# Patient Record
Sex: FEMALE | Race: WHITE | NOT HISPANIC OR LATINO | Employment: STUDENT | URBAN - METROPOLITAN AREA
[De-identification: names, ages, dates, MRNs, and addresses within clinical notes are randomized per-mention and may not be internally consistent; named-entity substitution may affect disease eponyms.]

---
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
## 2023-01-18 ENCOUNTER — OFFICE VISIT (OUTPATIENT)
Dept: PSYCHIATRY | Facility: CLINIC | Age: 18
End: 2023-01-18
Payer: MEDICAID

## 2023-01-18 DIAGNOSIS — F41.1 GAD (GENERALIZED ANXIETY DISORDER): Primary | ICD-10-CM

## 2023-01-18 DIAGNOSIS — F41.0 PANIC DISORDER: ICD-10-CM

## 2023-01-18 PROCEDURE — 99499 UNLISTED E&M SERVICE: CPT | Mod: HA,95,, | Performed by: PSYCHOLOGIST

## 2023-01-18 PROCEDURE — 90791 PSYCH DIAGNOSTIC EVALUATION: CPT | Mod: HA,95,, | Performed by: PSYCHOLOGIST

## 2023-01-18 PROCEDURE — 99499 NO LOS: ICD-10-PCS | Mod: HA,95,, | Performed by: PSYCHOLOGIST

## 2023-01-18 PROCEDURE — 90791 PR PSYCHIATRIC DIAGNOSTIC EVALUATION: ICD-10-PCS | Mod: HA,95,, | Performed by: PSYCHOLOGIST

## 2023-01-25 NOTE — PROGRESS NOTES
[facility] Psychiatry Initial Visit (PhD/LCSW)    Patient Name: Ismael Oconnell   Date: 1/18/2023  Site: [facility]  Referral source: Andree Escalera Np  [address] [place] [address]    Chief complaint/reason for encounter: Psychological Evaluation to assess suitability for admission to the [facility].  Clinical status of patient: Outpatient  Met with: Patient  CPT Code: 81192    Before this evaluation was initiated, the purposes and process of the assessment and the limits of confidentiality were discussed with the patient who expressed understanding of these issues and verbally consented to proceed with the evaluation.    The patient location is: [facility]    The chief complaint leading to consultation is: Anxiety    Visit type: audiovisual    Face to Face time with patient: 42    102 minutes of total time spent on the encounter, which includes face to face time and non-face to face time preparing to see the patient (eg, review of tests), Obtaining and/or reviewing separately obtained history, Documenting clinical information in the electronic or other health record, Independently interpreting results (not separately reported) and communicating results to the patient/family/caregiver, or Care coordination (not separately reported).     Each patient to whom he or she provides medical services by telemedicine is:  (1) informed of the relationship between the physician and patient and the respective role of any other health care provider with respect to management of the patient; and (2) notified that he or she may decline to receive medical services by telemedicine and may withdraw from such care at any time.    Notes:      History of present illness: Ms. Oconnell is a 17-year-old female who is pursuing psychotherapy to improve symptoms of anxiety.      Pain scale: History of joint pain and myalgia    Medical history:   Patient stated that she will get tonsils removed  soon as they have been problematic throughout her life. Patient reported that she was always sick, had a sore throat, and had sinus problems throughout childhood.    Psychiatric symptoms:    Depression:  She endorsed episodes of depressed mood or depression-related anhedonia, lack of motivation, lethargy, difficulty sleeping, difficulty concentrating, appetite changes, and fatigue. She denied suicidal ideation. Patient reported that she did not attend classes last semester and described symptoms as crippling. Patient reported feeling drained daily. Patient stated that she wants to attend school and attend class. She expressed that she struggled in Math and failed twice. 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 Patient reported that she has good days and bad days. Good days = social, extraverted, lively, life of the party Patient reported that she was active in sports during high school, but she has recently gained weight due to inactivity, fatigue, and changes in eating as a result of symptoms.     Lucinda/Hypomania: Denied. She denied periods of elevated mood or abnormally increased energy or goal-directed activity.    Anxiety: Patient endorsed excessive, exaggerated anxiety that was unmanageable. She stated that the onset of symptoms of anxiety was age 9 after her parents . She reported that she received counseling around this time, but she did not engage in open communication with the therapist. Patient also endorsed experiences of panic attacks. She stated that her heart began racing at night (palpitations) last semester after moving to [place] for school. Following, she went to the ER over the initial three days on campus to determine issue. Patient endorsed panic attacks and ruminating what if worst-case scenario thinking. She stated that she feels upset or  anxious before events even occurred. She reported that her last panic attack was approximately three days ago and occurrence has decreased since returning home for winter break.    Thoughts: Denied delusions, hallucinations.  Suicidal thoughts/behaviors: No current or past active suicidal ideation. No plan or intent to end her life.  Self-injury: Denied.    Current psychosocial stressors:  None other than presenting concerns. Patient conveyed that she loves school and campus  Report of coping skills: Spending time with friends regularly and during weekends, walking on campus, using a cold rag, talk therapy podcasts  Support system: Friends, roommates, mother, father  Strengths and Liabilities: Extraverted, fun-loving, passionate    Current and past substance use:  Alcohol:  Denied current use. Denied history of abuse or dependency.    Drugs: Denied current use. Denied history of abuse or dependency.  Tobacco: Denied current use.  Caffeine: Previously consumed Diet Coke, but stopped when informed that heart it could increase heart rate. Drinks water now     Current Psychiatric Treatment:  Medications: Effexor and Klonopin as needed  Propanol for heart racing   Trazadone prescribed, but never used  Patient expressed that her goal is to increase coping skills and wean off medications  Current Outpatient Medications on File Prior to Visit   Medication Sig Dispense Refill    clonazePAM (KLONOPIN) 0.5 MG tablet Take 1 tablet (0.5 mg total) by mouth 2 (two) times daily as needed for Anxiety (and panic). 60 tablet 0    propranoloL (INDERAL) 20 MG tablet Take 1 tablet (20 mg total) by mouth 2 (two) times daily as needed (anxiety and panic). 60 tablet 3    semaglutide (OZEMPIC) 0.25 mg or 0.5 mg(2 mg/1.5 mL) pen injector Inject 0.5 mg into the skin every 7 days.      traZODone (DESYREL) 50 MG tablet Take 1 tablet (50 mg total) by mouth every evening. 30 tablet 3    venlafaxine (EFFEXOR-XR) 150 MG Cp24 Take 1 capsule (150 mg  total) by mouth once daily. In addition to 75mg to equal 225mg. 30 capsule 3    venlafaxine (EFFEXOR-XR) 75 MG 24 hr capsule Take 1 capsule (75 mg total) by mouth once daily. In additon to 150mg to equal 225mg. 30 capsule 3     No current facility-administered medications on file prior to visit.        Psychotherapy: Patient stated that the onset of symptoms of anxiety was age 9 after her parents . She reported that she received counseling around this time, but she would not engage in open communication with the therapist    Psychiatric history:  Previous diagnosis: HAMZAH, Panic disorder, fatigue, depression, ADHD, myalgia, palpitations  Previous hospitalizations: Denied other than instances when patient visited ER when she felt her heart racing last semester.  History of outpatient treatment: Childhood  Previous suicide attempt: Denied.  Family history of psychiatric illness: Patient reported that her mother has some experience has history of anxiety and received therapy following the death of her father.    Trauma history: Denied.    Social history: Ms. Oconnell was raised by her biological mother and father in [place], [place].  Parents  when patient was 9. She reported that she still has a close relationship with both parents. She has a brother and paternal half-sister. She denied childhood trauma, abuse, and neglect. 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    Legal history: She did not report history of arrests and convictions. She did not report involvement in civil or criminal litigation.    Mental Status Exam:  General appearance: Appears stated age, neatly dressed, well groomed  Speech: Normal rate, normal tone, normal pitch, normal volume  Level of cooperation: Cooperative  Thought processes: Logical, goal-directed  Mood: Cheerful  Thought content: No illusions, no visual hallucinations, no auditory hallucinations, no delusions, no active or passive homicidal  thoughts, no active or passive suicidal ideation, no obsessions, no compulsions, no violence  Affect: Appropriate  Orientation: Oriented to person, place, and date  Memory: Intact  Judgment and insight: Fair  Language: Intact      Diagnostic impression:    ICD-10-CM ICD-9-CM   1. HAMZAH (generalized anxiety disorder)  F41.1 300.02   2. Panic disorder  F41.0 300.01          Plan: Ms. Oconnell will be admitted to the [facility]. She understood [facility] guidelines and signed the [facility] Consent and [facility] Partnership Agreement. She was provided with information about [facility] treatments and will proceed with CBT for Generalized Anxiety Disorder.      Length of Session: 42 minutes    Heather Bauman, PhD  Clinical Psychologist

## 2023-01-31 ENCOUNTER — TELEPHONE (OUTPATIENT)
Dept: PSYCHIATRY | Facility: CLINIC | Age: 18
End: 2023-01-31
Payer: MEDICAID

## 2023-01-31 NOTE — TELEPHONE ENCOUNTER
Spoke with patient to reschedule appointment. Provider will be out during time of original appointment. Patient agreed to reschedule to Wednesday 2/15 at 8am.

## 2023-02-15 ENCOUNTER — PATIENT MESSAGE (OUTPATIENT)
Dept: PSYCHIATRY | Facility: CLINIC | Age: 18
End: 2023-02-15
Payer: MEDICAID

## 2023-02-23 ENCOUNTER — OFFICE VISIT (OUTPATIENT)
Dept: PSYCHIATRY | Facility: CLINIC | Age: 18
End: 2023-02-23
Payer: MEDICAID

## 2023-02-23 VITALS
SYSTOLIC BLOOD PRESSURE: 146 MMHG | DIASTOLIC BLOOD PRESSURE: 55 MMHG | WEIGHT: 248.25 LBS | HEART RATE: 99 BPM | BODY MASS INDEX: 38.88 KG/M2

## 2023-02-23 DIAGNOSIS — G47.00 INSOMNIA, UNSPECIFIED TYPE: ICD-10-CM

## 2023-02-23 DIAGNOSIS — F41.1 GAD (GENERALIZED ANXIETY DISORDER): Primary | ICD-10-CM

## 2023-02-23 DIAGNOSIS — F32.A DEPRESSION, UNSPECIFIED DEPRESSION TYPE: ICD-10-CM

## 2023-02-23 DIAGNOSIS — E61.1 IRON DEFICIENCY: ICD-10-CM

## 2023-02-23 DIAGNOSIS — F41.0 PANIC DISORDER: ICD-10-CM

## 2023-02-23 PROCEDURE — 3008F PR BODY MASS INDEX (BMI) DOCUMENTED: ICD-10-PCS | Mod: SA,HA,CPTII, | Performed by: NURSE PRACTITIONER

## 2023-02-23 PROCEDURE — 3077F SYST BP >= 140 MM HG: CPT | Mod: SA,HA,CPTII, | Performed by: NURSE PRACTITIONER

## 2023-02-23 PROCEDURE — 99214 OFFICE O/P EST MOD 30 MIN: CPT | Mod: S$PBB,SA,HA, | Performed by: NURSE PRACTITIONER

## 2023-02-23 PROCEDURE — 1159F MED LIST DOCD IN RCRD: CPT | Mod: SA,HA,CPTII, | Performed by: NURSE PRACTITIONER

## 2023-02-23 PROCEDURE — 3077F PR MOST RECENT SYSTOLIC BLOOD PRESSURE >= 140 MM HG: ICD-10-PCS | Mod: SA,HA,CPTII, | Performed by: NURSE PRACTITIONER

## 2023-02-23 PROCEDURE — 1159F PR MEDICATION LIST DOCUMENTED IN MEDICAL RECORD: ICD-10-PCS | Mod: SA,HA,CPTII, | Performed by: NURSE PRACTITIONER

## 2023-02-23 PROCEDURE — 99999 PR PBB SHADOW E&M-EST. PATIENT-LVL II: CPT | Mod: PBBFAC,SA,HA, | Performed by: NURSE PRACTITIONER

## 2023-02-23 PROCEDURE — 90833 PSYTX W PT W E/M 30 MIN: CPT | Mod: SA,HA,, | Performed by: NURSE PRACTITIONER

## 2023-02-23 PROCEDURE — 3008F BODY MASS INDEX DOCD: CPT | Mod: SA,HA,CPTII, | Performed by: NURSE PRACTITIONER

## 2023-02-23 PROCEDURE — 99212 OFFICE O/P EST SF 10 MIN: CPT | Mod: PBBFAC | Performed by: NURSE PRACTITIONER

## 2023-02-23 PROCEDURE — 1160F RVW MEDS BY RX/DR IN RCRD: CPT | Mod: SA,HA,CPTII, | Performed by: NURSE PRACTITIONER

## 2023-02-23 PROCEDURE — 90833 PR PSYCHOTHERAPY W/PATIENT W/E&M, 30 MIN (ADD ON): ICD-10-PCS | Mod: SA,HA,, | Performed by: NURSE PRACTITIONER

## 2023-02-23 PROCEDURE — 3078F DIAST BP <80 MM HG: CPT | Mod: SA,HA,CPTII, | Performed by: NURSE PRACTITIONER

## 2023-02-23 PROCEDURE — 99999 PR PBB SHADOW E&M-EST. PATIENT-LVL II: ICD-10-PCS | Mod: PBBFAC,SA,HA, | Performed by: NURSE PRACTITIONER

## 2023-02-23 PROCEDURE — 3078F PR MOST RECENT DIASTOLIC BLOOD PRESSURE < 80 MM HG: ICD-10-PCS | Mod: SA,HA,CPTII, | Performed by: NURSE PRACTITIONER

## 2023-02-23 PROCEDURE — 1160F PR REVIEW ALL MEDS BY PRESCRIBER/CLIN PHARMACIST DOCUMENTED: ICD-10-PCS | Mod: SA,HA,CPTII, | Performed by: NURSE PRACTITIONER

## 2023-02-23 PROCEDURE — 99214 PR OFFICE/OUTPT VISIT, EST, LEVL IV, 30-39 MIN: ICD-10-PCS | Mod: S$PBB,SA,HA, | Performed by: NURSE PRACTITIONER

## 2023-02-23 RX ORDER — CLONAZEPAM 1 MG/1
1 TABLET ORAL 2 TIMES DAILY PRN
Qty: 60 TABLET | Refills: 0 | Status: SHIPPED | OUTPATIENT
Start: 2023-02-23 | End: 2024-02-21 | Stop reason: SDUPTHER

## 2023-02-23 RX ORDER — VENLAFAXINE HYDROCHLORIDE 150 MG/1
150 CAPSULE, EXTENDED RELEASE ORAL DAILY
Qty: 90 CAPSULE | Refills: 1 | Status: SHIPPED | OUTPATIENT
Start: 2023-02-23 | End: 2023-09-14 | Stop reason: SDUPTHER

## 2023-02-23 RX ORDER — VENLAFAXINE HYDROCHLORIDE 75 MG/1
75 CAPSULE, EXTENDED RELEASE ORAL DAILY
Qty: 90 CAPSULE | Refills: 1 | Status: SHIPPED | OUTPATIENT
Start: 2023-02-23 | End: 2023-09-14 | Stop reason: SDUPTHER

## 2023-02-23 NOTE — PROGRESS NOTES
"Outpatient Psychiatry Follow-Up Visit (MD/NP)    2/23/2023    Clinical Status of Patient:  Outpatient (Ambulatory)    Chief Complaint:  Ismael Oconnell is a 17 y.o. female who presents today for follow-up of depression and anxiety.  Met with patient.      Interval History and Content of Current Session:  Interim Events/Subjective Report/Content of Current Session:     Patient last seen 12/16/2022.     Patient reported a history of anxiety, panic and ADHD.     At the time of initial evaluation was taking lexapro 20mg and clonazepam 0.5mg PRN. At the time was recently on Stratterra 60mg, but stopped taking medication prior week because did not find it to be helpful.     Recently taken off of metformin, now only on Ozempic 0.5mg weekly [place].      Was previously seeing Dr. Arauz at [facility]. Had been seing him for about a year, and was not happy with the progress.      Reported onset of anxiety to be when she was going to 8th grade. Admitted at that time that is when parents were getting a divorce. Symptoms of anxiety started with difficulty breathing, restlessness. Started to see a counselor at [facility], but felt she would just cry during the visits.     Switched therapists, but still struggled with finding the right fit or "I would get better and stop going."      During high school mother would give her PRN clonazepam, which was helpful.      First sought medication management around 13 years old with a trial of Celexa. Ineffective. Then switched to a trial of Zoloft but was not effective either.      Switched to Lexapro a year and a half prior to initial visit, and had been on 20mg for the past year. Reported lexapro has been 20 % effective for symptom management.     Buspar added 3 months ago, but found it to be ineffective so it was discontinued.      Was diagnosed with ADHD last year, started with a trial of Adderall, but after 3 doses found it to cause " anxiety and palpitations and it was stopped. Switched to Strattera, but was ineffective so discontinued.     Currently a student at [employer] in her sophomore year. 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 Also considering Med school. Moving to [place] tomorrow with 3 other roomates. They are all friends.      Also worked this summer at [employer].     Complained of depression, anxiety, and panic symptoms.     Decreased lexapro to 10mg for 3-5 days then decreased to 5mg for another week then discontinue. Plan to start Effexor and titrate dose.     Following initial eval, 3 days later went to urgent care, who recommended she present to ED. Was seen in ED in [place] due to rapid heart beat, chest pain. Ruled out cardiac etiology.     Started Effexor XR which was increased to 75mg and again to 150mg last visit. Ok to refill small temporary supply of clonazepam started by a previous provider. Additionally started Propranolol 20mg BID PRN for anxiety and panic in an attempt to replace.     Presented last visit with mother, who noted noting a decline in mood. Mother stated a colleague mentioned to her a recommendation of Abilify. Discussed this in length.     Increased Effexor to 225mg  and started Trazodone 50mg last visit and referred to [facility] for [facility]otherapy.     Presents today reporting she has tolerated the increase in Effexor well.     Denies any side effects.     At last visit denied significant benefits with anxiety since starting medication or increasing dose, but presents today noting significant benefits in anxiety since the additional increase.    Decided to remain enrolled in school this semester.    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    Has a lighter course load this semester to help boost her GPA.     Notes improved mood, less fatigue, less need for naps.     Noted iron deficiency at previous lab visits, has been consistent  with supplement administration.     Notes decrease in amount of panic attacks experienced. Took 1mg of clonazepam at that time which had been helpful.     Reports improvements in sleep overall since she has had more energy and required less naps during the day. Has not taken trazodone due to not wanting to be on more medication than she has to.     Discussed at initial visit with patient that the cluster of noted throat obstruction with tonsils, insomnia, snoring, inattention/lack of focus, daytime fatigue, and episode reported of nocturia raises concern for the possibility for sleep apnea attributing to her current symptom presentation.     Admitted she often had swollen tonsils, and has been recommended she get them removed but has been scared.    Reports today she has plans to get tonsils removed over the summer.     Has been able to establish care with Dr. Bauman in [facility].     Denies SI/HI/AVH.     Previous medicatoin trials:  Zoloft  Lexapro  Celexa   Stratterra  Buspar           Psychotherapy:  Target symptoms: depression, anxiety , adjustment  Why chosen therapy is appropriate versus another modality: relevant to diagnosis  Outcome monitoring methods: self-report, observation  Therapeutic intervention type: insight oriented psychotherapy, supportive psychotherapy  Topics discussed/themes: building skills sets for symptom management, symptom recognition, life stage transitional issues  The patient's response to the intervention is accepting. The patient's progress toward treatment goals is good.   Duration of intervention: 18 minutes.    Review of Systems   PSYCHIATRIC: Pertinant items are noted in the narrative.  CONSTITUTIONAL: No weight gain or loss.   MUSCULOSKELETAL: No pain or stiffness of the joints.  NEUROLOGIC: No weakness, sensory changes, seizures, confusion, memory loss, tremor or other abnormal movements.  ENDOCRINE: No polydipsia or polyuria.  INTEGUMENTARY: No rashes or  lacerations.  EYES: No exophthalmos, jaundice or blindness.  ENT: No dizziness, tinnitus or hearing loss.  RESPIRATORY: No shortness of breath.  CARDIOVASCULAR: No tachycardia or chest pain.  GASTROINTESTINAL: No nausea, vomiting, pain, constipation or diarrhea.  GENITOURINARY: No frequency, dysuria or sexual dysfunction.  HEMATOLOGIC/LYMPHATIC: No excessive bleeding, prolonged or excessive bleeding after dental extraction/injury.  ALLERGIC/IMMUNOLOGIC: No allergic response to materials, foods or animals at this time.      Past Medical, Family and Social History: The patient's past medical, family and social history have been reviewed and updated as appropriate within the electronic medical record - see encounter notes.    Compliance: yes    Side effects: None    Risk Parameters:  Patient reports no suicidal ideation  Patient reports no homicidal ideation  Patient reports no self-injurious behavior  Patient reports no violent behavior    Exam (detailed: at least 9 elements; comprehensive: all 15 elements)   Constitutional  Vitals:  Most recent vital signs, dated less than 90 days prior to this appointment, were reviewed.   Vitals:    02/23/23 1359   BP: (!) 146/55   Pulse: 99   Weight: 112.6 kg (248 lb 3.8 oz)          General:  unremarkable, age appropriate     Musculoskeletal  Muscle Strength/Tone:  not examined   Gait & Station:  non-ataxic     Psychiatric  Speech:  no latency; no press   Mood & Affect:  anxious  congruent and appropriate   Thought Process:  normal and logical   Associations:  intact   Thought Content:  normal, no suicidality, no homicidality, delusions, or paranoia   Insight:  intact   Judgement: behavior is adequate to circumstances   Orientation:  grossly intact   Memory: intact for content of interview   Language: grossly intact   Attention Span & Concentration:  able to focus   Fund of Knowledge:  intact and appropriate to age and level of education     Assessment and Diagnosis    Status/Progress: Based on the examination today, the patient's problem(s) is/are improved and adequately but not ideally controlled.  New problems have not been presented today.   Co-morbidities, Diagnostic uncertainty, and Lack of compliance are not complicating management of the primary condition.  There are no active rule-out diagnoses for this patient at this time.     General Impression: Ismael Oconnell is a 17 year old female presenting to follow up after establishing care for evaluation and management of depression, anxiety, panic, and ADHD.      Discussed holding on treatment of ADHD symptoms at this time as panic symptoms place priority. Additionally discussed that most medication to treat ADHD has risk of increasing anxiety. Patient very accepting.      Discussed future referral to sleep medicine, especially if sleep or fatigue does not improve with better management of anxiety.      Discussed possibility of academic probation with decline in grades last semester and offered patient my assistance to advocate as she has been engaged in treatment.     Has remained in school this semester taking lighter courses to assist in boosting GPA which she reports has been going well.          ICD-10-CM ICD-9-CM   1. HAMZAH (generalized anxiety disorder)  F41.1 300.02   2. Panic disorder  F41.0 300.01   3. Depression, unspecified depression type  F32.A 311   4. Insomnia, unspecified type  G47.00 780.52   5. Iron deficiency  E61.1 280.9           Intervention/Counseling/Treatment Plan   Medication Management: Continue Effexor 225 mg for depression, anxiety and panic. OK to refill clonazepam and discussed this being temporary while we wait to achieve therapeutic levels of Effexor, or find alternative PRN medication option. Continue trazodone 50mg for insomnia and adjunct mood support as patient prefers to keep as PRN as needed  Labs, Diagnostic Studies: reviewed labs ordered at initial visit including CBC,  CMP, TSH, Vitamin  D. Revealed iron deficiency. Discussed supplement in portal and currently taking. Plan to reassess at next visit to assess if able to replenish stores with consistent administration.   Referral to [facility] psychotherapy.    reviewed no flagged dispensing.  Discussed with patient informed consent, risks vs. benefits, alternative treatments, side effect profile and the inherent unpredictability of individual responses to these treatments. The patient expresses understanding of the above and displays the capacity to agree with this current plan and had no other questions.  Encouraged Patient to keep future appointments.   Take medications as prescribed and abstain from substance abuse.   Safety plan reviewed with patient for worsening condition or suicidal ideations. In the event of an emergency patient was advised to go to the emergency room.       Return to Clinic: 3 months

## 2023-03-01 ENCOUNTER — PATIENT MESSAGE (OUTPATIENT)
Dept: PSYCHIATRY | Facility: CLINIC | Age: 18
End: 2023-03-01
Payer: MEDICAID

## 2023-03-08 ENCOUNTER — OFFICE VISIT (OUTPATIENT)
Dept: PSYCHIATRY | Facility: CLINIC | Age: 18
End: 2023-03-08
Payer: MEDICAID

## 2023-03-08 ENCOUNTER — PATIENT MESSAGE (OUTPATIENT)
Dept: PSYCHIATRY | Facility: CLINIC | Age: 18
End: 2023-03-08

## 2023-03-08 DIAGNOSIS — F41.1 GAD (GENERALIZED ANXIETY DISORDER): Primary | ICD-10-CM

## 2023-03-08 PROCEDURE — 90834 PR PSYCHOTHERAPY W/PATIENT, 45 MIN: ICD-10-PCS | Mod: 95,,, | Performed by: PSYCHOLOGIST

## 2023-03-08 PROCEDURE — 99499 UNLISTED E&M SERVICE: CPT | Mod: 95,,, | Performed by: PSYCHOLOGIST

## 2023-03-08 PROCEDURE — 99499 NO LOS: ICD-10-PCS | Mod: 95,,, | Performed by: PSYCHOLOGIST

## 2023-03-08 PROCEDURE — 90834 PSYTX W PT 45 MINUTES: CPT | Mod: 95,,, | Performed by: PSYCHOLOGIST

## 2023-03-09 NOTE — PROGRESS NOTES
"  [facility]  Individual Psychotherapy (PhD/LCSW)    3/8/23     The patient location is: [facility]  in [place], [place]  The chief complaint leading to consultation is: Anxiety    Visit type: audiovisual    Face to Face time with patient: 42  60 minutes of total time spent on the encounter, which includes face to face time and non-face to face time preparing to see the patient (eg, review of tests), Obtaining and/or reviewing separately obtained history, Documenting clinical information in the electronic or other health record, Independently interpreting results (not separately reported) and communicating results to the patient/family/caregiver, or Care coordination (not separately reported).       Each patient to whom he or she provides medical services by telemedicine is:  (1) informed of the relationship between the physician and patient and the respective role of any other health care provider with respect to management of the patient; and (2) notified that he or she may decline to receive medical services by telemedicine and may withdraw from such care at any time.    Notes:      Therapeutic Intervention: Met with patient.  Outpatient - Insight oriented psychotherapy 42 min - CPT code 81800     Chief complaint/reason for encounter: Anxiety       Interval history and content of current session:  Ismael Oconnell arrived on time to the scheduled appointment. She appeared calm and she stated that she was feeling "good."    Cognitive Behavioral Therapy for Anxiety (CBT-A), Session #1  Processing Power/Control and Introducing Esteem  HAMZAH-7: 4 (Minimal Anxiety)     Session Focus:  Normal Worry vs. Generalized Anxiety  Why It's Important to Record  Record Keeping              Worry Record              Daily Mood Record              Introduced the Esteem Theme     Practice Assignment:  Monitor anxiety episodes and daily anxiety using the Worry Record and the Daily Mood Record.        Treatment plan:  Target symptoms: "  anxiety-related symptoms  Why chosen therapy is appropriate versus another modality: relevant to diagnosis, evidence-based practice  Outcome monitoring methods: self-report, checklist/rating scale  Therapeutic intervention type: insight-oriented psychotherapy, behavior modifying psychotherapy     Risk parameters:  Patient reports no suicidal ideation  Patient reports no homicidal ideation  Patient reports no self-injurious behavior  Patient reports no violent behavior     Verbal deficits: None     Patient's response to intervention:  Receptive      Progress toward goals and other mental status changes:  Started     Diagnosis:       ICD-10-CM ICD-9-CM   1. HAMZAH (generalized anxiety disorder)  F41.1 300.02         Plan:  Continue individual psychotherapy     Return to clinic: 1 week     Length of Service (minutes): 42

## 2023-03-17 ENCOUNTER — PATIENT MESSAGE (OUTPATIENT)
Dept: PSYCHIATRY | Facility: CLINIC | Age: 18
End: 2023-03-17
Payer: MEDICAID

## 2023-03-22 ENCOUNTER — OFFICE VISIT (OUTPATIENT)
Dept: PSYCHIATRY | Facility: CLINIC | Age: 18
End: 2023-03-22
Payer: MEDICAID

## 2023-03-22 DIAGNOSIS — F41.0 PANIC DISORDER: ICD-10-CM

## 2023-03-22 DIAGNOSIS — F41.1 GAD (GENERALIZED ANXIETY DISORDER): Primary | ICD-10-CM

## 2023-03-22 PROCEDURE — 90834 PSYTX W PT 45 MINUTES: CPT | Mod: 95,AH,HA, | Performed by: PSYCHOLOGIST

## 2023-03-22 PROCEDURE — 99499 NO LOS: ICD-10-PCS | Mod: AH,HA,95, | Performed by: PSYCHOLOGIST

## 2023-03-22 PROCEDURE — 99499 UNLISTED E&M SERVICE: CPT | Mod: AH,HA,95, | Performed by: PSYCHOLOGIST

## 2023-03-22 PROCEDURE — 90834 PR PSYCHOTHERAPY W/PATIENT, 45 MIN: ICD-10-PCS | Mod: 95,AH,HA, | Performed by: PSYCHOLOGIST

## 2023-03-22 NOTE — PROGRESS NOTES
"  [facility]  Individual Psychotherapy (PhD/LCSW)     3/22/23     The patient location is: [facility]  in [place], [place]  The chief complaint leading to consultation is: Anxiety     Visit type: audiovisual     Face to Face time with patient: 42  60 minutes of total time spent on the encounter, which includes face to face time and non-face to face time preparing to see the patient (eg, review of tests), Obtaining and/or reviewing separately obtained history, Documenting clinical information in the electronic or other health record, Independently interpreting results (not separately reported) and communicating results to the patient/family/caregiver, or Care coordination (not separately reported).         Each patient to whom he or she provides medical services by telemedicine is:  (1) informed of the relationship between the physician and patient and the respective role of any other health care provider with respect to management of the patient; and (2) notified that he or she may decline to receive medical services by telemedicine and may withdraw from such care at any time.     Notes:       Therapeutic Intervention: Met with patient.  Outpatient - Insight oriented psychotherapy 45 min - CPT code 51345     Chief complaint/reason for encounter: Anxiety       Interval history and content of current session:  Ismael Oconnell arrived on time to the scheduled appointment. She appeared calm and she stated that she was feeling "good."       Cognitive Behavioral Therapy for Anxiety (CBT-A), Session #2     Session Focus:  Review homework  Understand components of anxiety   Explore  negative thoughts about body image, body dissatisfaction, comparison to others,                  the "thin ideal" and how it leads to anxiety  Recognize physical symptoms of anxiety and record them   Continue record keeping                 Practice Assignment:  Use information on worry record to identify positive feedback loops among anxiety " components  Continue using worry record and daily mood record  Begin to explore relaxation and grounding exercises      Treatment plan:  Target symptoms:  anxiety-related symptoms  Why chosen therapy is appropriate versus another modality: relevant to diagnosis, evidence-based practice  Outcome monitoring methods: self-report, checklist/rating scale  Therapeutic intervention type: insight-oriented psychotherapy, behavior modifying psychotherapy     Risk parameters:  Patient reports no suicidal ideation  Patient reports no homicidal ideation  Patient reports no self-injurious behavior  Patient reports no violent behavior     Verbal deficits: None     Patient's response to intervention:  Receptive      Progress toward goals and other mental status changes:  Progressing appropriately     Diagnosis:       ICD-10-CM ICD-9-CM   1. HAMZAH (generalized anxiety disorder)  F41.1 300.02         Plan:  Continue individual psychotherapy     Return to clinic: 1 week     Length of Service (minutes): 42

## 2023-03-29 ENCOUNTER — PATIENT MESSAGE (OUTPATIENT)
Dept: PSYCHIATRY | Facility: CLINIC | Age: 18
End: 2023-03-29

## 2023-04-16 ENCOUNTER — HOSPITAL ENCOUNTER (EMERGENCY)
Facility: HOSPITAL | Age: 18
Discharge: HOME | End: 2023-04-17
Attending: EMERGENCY MEDICINE
Payer: MEDICAID

## 2023-04-16 DIAGNOSIS — S09.90XA TRAUMATIC INJURY OF HEAD, INITIAL ENCOUNTER: ICD-10-CM
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
## 2023-04-16 DIAGNOSIS — S06.0X0A CONCUSSION WITHOUT LOSS OF CONSCIOUSNESS, INITIAL ENCOUNTER: ICD-10-CM

## 2023-04-16 LAB
B-HCG UR QL: NEGATIVE
CTP QC/QA: YES

## 2023-04-16 PROCEDURE — 81025 URINE PREGNANCY TEST: CPT | Performed by: PHYSICIAN ASSISTANT

## 2023-04-16 PROCEDURE — 99283 PR EMERGENCY DEPT VISIT,LEVEL III: ICD-10-PCS | Mod: ,,, | Performed by: PHYSICIAN ASSISTANT

## 2023-04-16 PROCEDURE — 99284 EMERGENCY DEPT VISIT MOD MDM: CPT

## 2023-04-16 PROCEDURE — 99283 EMERGENCY DEPT VISIT LOW MDM: CPT | Mod: ,,, | Performed by: PHYSICIAN ASSISTANT

## 2023-04-16 NOTE — Clinical Note
"Ismael"Yamini Oconnell was seen and treated in our emergency department on 4/16/2023.  She may return to school on 04/17/2023.      If you have any questions or concerns, please don't hesitate to call.      Giovanna Carter PA-C"

## 2023-04-16 NOTE — Clinical Note
"Ismael"Yamini Oconnell was seen and treated in our emergency department on 4/16/2023.  She may return to work on 04/18/2023.       If you have any questions or concerns, please don't hesitate to call.      Giovanna Carter PA-C"

## 2023-04-17 VITALS
DIASTOLIC BLOOD PRESSURE: 60 MMHG | OXYGEN SATURATION: 100 % | BODY MASS INDEX: 37.67 KG/M2 | HEIGHT: 67 IN | HEART RATE: 75 BPM | WEIGHT: 240 LBS | RESPIRATION RATE: 20 BRPM | TEMPERATURE: 98 F | SYSTOLIC BLOOD PRESSURE: 124 MMHG

## 2023-04-17 NOTE — DISCHARGE INSTRUCTIONS
Imaging Results              CT Head Without Contrast (Final result)  Result time 04/17/23 00:47:09      Final result by Zaira Vegas MD (04/17/23 00:47:09)                   Impression:      No acute abnormality.      Electronically signed by: Zaira Vegas  Date:    04/17/2023  Time:    00:47               Narrative:    EXAMINATION:  CT HEAD WITHOUT CONTRAST    CLINICAL HISTORY:  Head trauma, minor, normal mental status (Age 19-64y);Neuro deficit, acute, stroke suspected;    TECHNIQUE:  Low dose axial CT images obtained throughout the head without intravenous contrast. Sagittal and coronal reconstructions were performed.    COMPARISON:  None.    FINDINGS:  Intracranial compartment:    Ventricles and sulci are normal in size for age without evidence of hydrocephalus. No extra-axial blood or fluid collections.    The brain parenchyma appears normal. No parenchymal mass, hemorrhage, edema or major vascular distribution infarct.    Skull/extracranial contents (limited evaluation): No fracture. Mastoid air cells and paranasal sinuses are essentially clear.
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
## 2023-04-19 ENCOUNTER — PATIENT MESSAGE (OUTPATIENT)
Dept: PSYCHIATRY | Facility: CLINIC | Age: 18
End: 2023-04-19
Payer: MEDICAID

## 2023-06-21 ENCOUNTER — PATIENT MESSAGE (OUTPATIENT)
Dept: PSYCHIATRY | Facility: CLINIC | Age: 18
End: 2023-06-21
Payer: MEDICAID

## 2023-07-12 ENCOUNTER — PATIENT MESSAGE (OUTPATIENT)
Dept: PSYCHIATRY | Facility: CLINIC | Age: 18
End: 2023-07-12
Payer: MEDICAID

## 2023-08-21 ENCOUNTER — PATIENT MESSAGE (OUTPATIENT)
Dept: PSYCHIATRY | Facility: CLINIC | Age: 18
End: 2023-08-21
Payer: MEDICAID

## 2023-09-14 RX ORDER — VENLAFAXINE HYDROCHLORIDE 75 MG/1
75 CAPSULE, EXTENDED RELEASE ORAL DAILY
Qty: 90 CAPSULE | Refills: 1 | Status: SHIPPED | OUTPATIENT
Start: 2023-09-14 | End: 2023-10-04 | Stop reason: SDUPTHER

## 2023-09-14 RX ORDER — VENLAFAXINE HYDROCHLORIDE 150 MG/1
150 CAPSULE, EXTENDED RELEASE ORAL DAILY
Qty: 90 CAPSULE | Refills: 1 | Status: SHIPPED | OUTPATIENT
Start: 2023-09-14 | End: 2023-10-04 | Stop reason: SDUPTHER

## 2023-10-04 ENCOUNTER — OFFICE VISIT (OUTPATIENT)
Dept: PSYCHIATRY | Facility: CLINIC | Age: 18
End: 2023-10-04
Payer: MEDICAID

## 2023-10-04 DIAGNOSIS — F41.0 PANIC DISORDER: ICD-10-CM

## 2023-10-04 DIAGNOSIS — F32.A DEPRESSION, UNSPECIFIED DEPRESSION TYPE: ICD-10-CM

## 2023-10-04 DIAGNOSIS — F41.1 GAD (GENERALIZED ANXIETY DISORDER): Primary | ICD-10-CM

## 2023-10-04 DIAGNOSIS — E61.1 IRON DEFICIENCY: ICD-10-CM

## 2023-10-04 DIAGNOSIS — G47.00 INSOMNIA, UNSPECIFIED TYPE: ICD-10-CM

## 2023-10-04 PROCEDURE — 90833 PR PSYCHOTHERAPY W/PATIENT W/E&M, 30 MIN (ADD ON): ICD-10-PCS | Mod: SA,HA,95, | Performed by: NURSE PRACTITIONER

## 2023-10-04 PROCEDURE — 99214 OFFICE O/P EST MOD 30 MIN: CPT | Mod: SA,HA,95, | Performed by: NURSE PRACTITIONER

## 2023-10-04 PROCEDURE — 1159F MED LIST DOCD IN RCRD: CPT | Mod: CPTII,95,, | Performed by: NURSE PRACTITIONER

## 2023-10-04 PROCEDURE — 99214 PR OFFICE/OUTPT VISIT, EST, LEVL IV, 30-39 MIN: ICD-10-PCS | Mod: SA,HA,95, | Performed by: NURSE PRACTITIONER

## 2023-10-04 PROCEDURE — 1159F PR MEDICATION LIST DOCUMENTED IN MEDICAL RECORD: ICD-10-PCS | Mod: CPTII,95,, | Performed by: NURSE PRACTITIONER

## 2023-10-04 PROCEDURE — 1160F PR REVIEW ALL MEDS BY PRESCRIBER/CLIN PHARMACIST DOCUMENTED: ICD-10-PCS | Mod: CPTII,95,, | Performed by: NURSE PRACTITIONER

## 2023-10-04 PROCEDURE — 90833 PSYTX W PT W E/M 30 MIN: CPT | Mod: SA,HA,95, | Performed by: NURSE PRACTITIONER

## 2023-10-04 PROCEDURE — 1160F RVW MEDS BY RX/DR IN RCRD: CPT | Mod: CPTII,95,, | Performed by: NURSE PRACTITIONER

## 2023-10-04 RX ORDER — TRAZODONE HYDROCHLORIDE 50 MG/1
50 TABLET ORAL NIGHTLY
Qty: 90 TABLET | Refills: 1 | Status: SHIPPED | OUTPATIENT
Start: 2023-10-04 | End: 2024-01-09 | Stop reason: SDUPTHER

## 2023-10-04 RX ORDER — VENLAFAXINE HYDROCHLORIDE 150 MG/1
150 CAPSULE, EXTENDED RELEASE ORAL DAILY
Qty: 90 CAPSULE | Refills: 1 | Status: SHIPPED | OUTPATIENT
Start: 2023-10-04 | End: 2024-01-09 | Stop reason: SDUPTHER

## 2023-10-04 RX ORDER — PROPRANOLOL HYDROCHLORIDE 20 MG/1
TABLET ORAL
Qty: 60 TABLET | Refills: 3 | Status: SHIPPED | OUTPATIENT
Start: 2023-10-04 | End: 2024-01-09 | Stop reason: SDUPTHER

## 2023-10-04 RX ORDER — VENLAFAXINE HYDROCHLORIDE 75 MG/1
75 CAPSULE, EXTENDED RELEASE ORAL DAILY
Qty: 90 CAPSULE | Refills: 1 | Status: SHIPPED | OUTPATIENT
Start: 2023-10-04 | End: 2024-01-09 | Stop reason: SDUPTHER
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
## 2023-12-10 ENCOUNTER — PATIENT MESSAGE (OUTPATIENT)
Dept: PSYCHIATRY | Facility: CLINIC | Age: 18
End: 2023-12-10
Payer: MEDICAID

## 2024-01-09 ENCOUNTER — OFFICE VISIT (OUTPATIENT)
Dept: PSYCHIATRY | Facility: CLINIC | Age: 19
End: 2024-01-09
Payer: MEDICAID

## 2024-01-09 DIAGNOSIS — F41.1 GAD (GENERALIZED ANXIETY DISORDER): Primary | ICD-10-CM

## 2024-01-09 DIAGNOSIS — F32.A DEPRESSION, UNSPECIFIED DEPRESSION TYPE: ICD-10-CM

## 2024-01-09 DIAGNOSIS — F41.0 PANIC DISORDER: ICD-10-CM

## 2024-01-09 DIAGNOSIS — R63.2 BINGE EATING: ICD-10-CM

## 2024-01-09 DIAGNOSIS — E61.1 IRON DEFICIENCY: ICD-10-CM

## 2024-01-09 DIAGNOSIS — G47.00 INSOMNIA, UNSPECIFIED TYPE: ICD-10-CM

## 2024-01-09 PROCEDURE — 1160F RVW MEDS BY RX/DR IN RCRD: CPT | Mod: CPTII,95,, | Performed by: NURSE PRACTITIONER

## 2024-01-09 PROCEDURE — 1159F MED LIST DOCD IN RCRD: CPT | Mod: CPTII,95,, | Performed by: NURSE PRACTITIONER

## 2024-01-09 PROCEDURE — 99214 OFFICE O/P EST MOD 30 MIN: CPT | Mod: SA,HA,95, | Performed by: NURSE PRACTITIONER

## 2024-01-09 PROCEDURE — 90833 PSYTX W PT W E/M 30 MIN: CPT | Mod: SA,HA,95, | Performed by: NURSE PRACTITIONER

## 2024-01-09 RX ORDER — VENLAFAXINE HYDROCHLORIDE 75 MG/1
75 CAPSULE, EXTENDED RELEASE ORAL DAILY
Qty: 90 CAPSULE | Refills: 1 | Status: SHIPPED | OUTPATIENT
Start: 2024-01-09 | End: 2025-01-08

## 2024-01-09 RX ORDER — PROPRANOLOL HYDROCHLORIDE 20 MG/1
TABLET ORAL
Qty: 60 TABLET | Refills: 3 | Status: SHIPPED | OUTPATIENT
Start: 2024-01-09

## 2024-01-09 RX ORDER — VENLAFAXINE HYDROCHLORIDE 150 MG/1
150 CAPSULE, EXTENDED RELEASE ORAL DAILY
Qty: 90 CAPSULE | Refills: 1 | Status: SHIPPED | OUTPATIENT
Start: 2024-01-09 | End: 2025-01-08

## 2024-01-09 RX ORDER — TRAZODONE HYDROCHLORIDE 50 MG/1
50 TABLET ORAL NIGHTLY
Qty: 90 TABLET | Refills: 1 | Status: SHIPPED | OUTPATIENT
Start: 2024-01-09 | End: 2025-01-08

## 2024-01-09 NOTE — PROGRESS NOTES
Outpatient Psychiatry Follow-Up Visit (MD/NP)    1/9/2024    The patient location is: [facility]  The chief complaint leading to consultation is: anxiety     Visit type: audiovisual    Face to Face time with patient: 39 minutes   49 minutes of total time spent on the encounter, which includes face to face time and non-face to face time preparing to see the patient (eg, review of tests), Obtaining and/or reviewing separately obtained history, Documenting clinical information in the electronic or other health record, Independently interpreting results (not separately reported) and communicating results to the patient/family/caregiver, or Care coordination (not separately reported).         Each patient to whom he or she provides medical services by telemedicine is:  (1) informed of the relationship between the physician and patient and the respective role of any other health care provider with respect to management of the patient; and (2) notified that he or she may decline to receive medical services by telemedicine and may withdraw from such care at any time.    Notes:      Clinical Status of Patient:  Outpatient (Ambulatory)    Chief Complaint:  Ismael Oconnell is a 18 y.o. female who presents today for follow-up of depression and anxiety.  Met with patient.      Interval History and Content of Current Session:  Interim Events/Subjective Report/Content of Current Session:     Patient last seen 10/04/2023.    Patient reported a history of anxiety, panic and ADHD.     At the time of initial evaluation was taking lexapro 20mg and clonazepam 0.5mg PRN. At the time was recently on Stratterra 60mg, but stopped taking medication prior week because did not find it to be helpful.     At the time had recently been taken off of metformin, switched to Ozempic 0.5mg weekly [place].      Was previously seeing Dr. Arauz at [facility]. Had been seing him for about a year, and was not  "happy with the progress.      Reported onset of anxiety to be when she was going to 8th grade. Admitted at that time that is when parents were getting a divorce. Symptoms of anxiety started with difficulty breathing, restlessness. Started to see a counselor at [facility], but felt she would just cry during the visits.     Switched therapists, but still struggled with finding the right fit or "I would get better and stop going."      During high school mother would give her PRN clonazepam, which was helpful.      First sought medication management around 13 years old with a trial of Celexa. Ineffective. Then switched to a trial of Zoloft but was not effective either.      Switched to Lexapro a year and a half prior to initial visit, and had been on 20mg for the past year. Reported lexapro has been 20 % effective for symptom management.     Busmaria teresa added 3 months ago, but found it to be ineffective so it was discontinued.      Was diagnosed with ADHD last year, started with a trial of Adderall, but after 3 doses found it to cause anxiety and palpitations and it was stopped. Switched to Strattera, but was ineffective so discontinued.     Currently a student at [employer]. Was going to school for kinesiology on a PA track. Also considering Med school. Moving to [place] tomorrow with 3 other roomates. They are all friends.      Also worked at [employer].     Complained of depression, anxiety, and panic symptoms.     Decreased lexapro to 10mg for 3-5 days then decreased to 5mg for another week then discontinue. Plan to start Effexor and titrate dose.     Following initial eval, 3 days later went to urgent care, who recommended she present to ED. Was seen in ED in [place] due to rapid heart beat, chest pain. Ruled out cardiac etiology.     Started Effexor XR which was increased to 75mg and again to 150mg last visit. Ok to refill small temporary supply of clonazepam started by a previous provider. " Additionally started Propranolol 20mg BID PRN for anxiety and panic in an attempt to replace.     Presented to a previous visit with mother, noting a decline in mood. Mother stated a colleague mentioned to her a recommendation of Abilify. Discussed this in length.     Increased Effexor to 225mg  and started Trazodone 50mg last visit and referred to [facility] for [facility]otherapy.     Presented last visit reporting she had tolerated the increase in Effexor well.     While she initially denied significant benefits with anxiety since starting medication or increasing dose, at last visit noted significant benefits in anxiety since the additional increase.    Decided to remain enrolled in school that semester.    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    Had a lighter course load to help boost her GPA.     Noted improved mood, less fatigue, less need for naps.     Noted iron deficiency at previous lab visits, had been consistent with supplement administration.     Noted decrease in amount of panic attacks experienced. Took 1mg of clonazepam at that time which had been helpful.     Reported improvements in sleep overall since she has had more energy and required less naps during the day. Had not taken trazodone due to not wanting to be on more medication than she has to.     Discussed at initial visit with patient that the cluster of noted throat obstruction with tonsils, insomnia, snoring, inattention/lack of focus, daytime fatigue, and episode reported of nocturia raises concern for the possibility for sleep apnea attributing to her current symptom presentation.     Admitted she often had swollen tonsils, and has been recommended she get them removed but has been scared.    Reported she had plans to get tonsils removed over the summer.     Had been able to establish care with Dr. Bauman in [facility].     Continued medication unchanged.     Reached out in portal  "6/21/2023 requesting additional paperwork to be filled to substantiate patient resigning from fall 2022 semester. Completed paperwork requested.    --Presented to last visit noting she had done well since last seen.     Stated her grades were forgiven and wiped from record which has provided substantial relief in anxiety.     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 Grades have improved.     "School is so much better now!"    Will be applying for internships this summer.     To note patient was significantly more relaxed while discussing school.     With more time continued to tolerate Effexor well for anxiety management.     Did admit that historically she would experience vertigo symptoms if she missed a dose.     Noticed with more time would begin to experience vertigo symptoms in evenings prior to night time medication administration.     Had not had a panic attack since last seen. Had used propranolol once. Had not had the use clonazepam.     Continued medication unchanged. Discussed consideration of a switch to Pristiq in future due to difficulty with withdrawal symptoms. Plan to wait until end of semester on break.        --Presents today reporting she has been doing "ok" since last seen.     Reached out in December noting increase in heart rate. Seen by cardiology who completed work up.     Noted valve regurgitation and tachycardia, but no other abnormalities.     Denies any triggers for anxiety during that time. However denies this episode to have occurred since.     Admits with more time feels previous withdrawal/vertigo symptoms experienced when late taking medication dose has lessened.     Admits residual anxiety related to weight management.     No longer on ozempic.     Now on topamax and phentermine. Cardiology aware patient is now on phentermine.     Denies medication to have impacted focus or caused increase in anxiety.     Continues to struggle with binge eating behaviors.     Discussed " comprehensive resources for support.     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    Starting a  pod cast.    Applying for Mass Communication program.     [employer] sports internship applying for this summer.     Plan to graduate next year.     Reports stable sleep.     Discussed benefits of adding psychotherapy to treatment plan. Plans to attend therapy closer to school.     Denies SI/HI/AVH.     Previous medicatoin trials:  Zoloft  Lexapro  Celexa   Stratterra  Buspar           Psychotherapy:  Target symptoms: depression, anxiety , adjustment  Why chosen therapy is appropriate versus another modality: relevant to diagnosis  Outcome monitoring methods: self-report, observation  Therapeutic intervention type: insight oriented psychotherapy, supportive psychotherapy  Topics discussed/themes: building skills sets for symptom management, symptom recognition, life stage transitional issues  The patient's response to the intervention is accepting. The patient's progress toward treatment goals is good.   Duration of intervention: 20 minutes.    Review of Systems   PSYCHIATRIC: Pertinant items are noted in the narrative.  CONSTITUTIONAL: No weight gain or loss.   MUSCULOSKELETAL: No pain or stiffness of the joints.  NEUROLOGIC: No weakness, sensory changes, seizures, confusion, memory loss, tremor or other abnormal movements.  ENDOCRINE: No polydipsia or polyuria.  INTEGUMENTARY: No rashes or lacerations.  EYES: No exophthalmos, jaundice or blindness.  ENT: No dizziness, tinnitus or hearing loss.  RESPIRATORY: No shortness of breath.  CARDIOVASCULAR: No tachycardia or chest pain.  GASTROINTESTINAL: No nausea, vomiting, pain, constipation or diarrhea.  GENITOURINARY: No frequency, dysuria or sexual dysfunction.  HEMATOLOGIC/LYMPHATIC: No excessive bleeding, prolonged or excessive bleeding after dental  extraction/injury.  ALLERGIC/IMMUNOLOGIC: No allergic response to materials, foods or animals at this time.      Past Medical, Family and Social History: The patient's past medical, family and social history have been reviewed and updated as appropriate within the electronic medical record - see encounter notes.    Compliance: yes    Side effects: None    Risk Parameters:  Patient reports no suicidal ideation  Patient reports no homicidal ideation  Patient reports no self-injurious behavior  Patient reports no violent behavior    Exam (detailed: at least 9 elements; comprehensive: all 15 elements)   Constitutional  Vitals:  Most recent vital signs, dated less than 90 days prior to this appointment, were reviewed.   There were no vitals filed for this visit.  Reviewed last set of vitals on file  /60  Pulse 75       General:  unremarkable, age appropriate     Musculoskeletal  Muscle Strength/Tone:  not examined   Gait & Station:  JOSE, seated during virtual assessment      Psychiatric  Speech:  no latency; no press   Mood & Affect:  steady, happy  congruent and appropriate   Thought Process:  normal and logical   Associations:  intact   Thought Content:  normal, no suicidality, no homicidality, delusions, or paranoia   Insight:  intact   Judgement: behavior is adequate to circumstances   Orientation:  grossly intact   Memory: intact for content of interview   Language: grossly intact   Attention Span & Concentration:  able to focus   Fund of Knowledge:  intact and appropriate to age and level of education     Assessment and Diagnosis   Status/Progress: Based on the examination today, the patient's problem(s) is/are adequately but not ideally controlled.  New problems have not been presented today.   Co-morbidities, Diagnostic uncertainty, and Lack of compliance are not complicating management of the primary condition.  There are no active rule-out diagnoses for this patient at this time.     General Impression:  Ismael Oconnell is a 18 year old female presenting to follow up after establishing care for evaluation and management of depression, anxiety, panic, and ADHD.      Discussed holding on treatment of ADHD symptoms at this time as panic symptoms place priority. Additionally discussed that most medication to treat ADHD has risk of increasing anxiety. Patient very accepting.      Discussed future referral to sleep medicine, especially if sleep or fatigue does not improve with better management of anxiety.      Has remained in school this semester taking lighter courses to assist in boosting GPA which she reports has been going well.     Consider switch to Pristiq in future due to difficulty with withdrawal symptoms. At last visit planned to wait until end of semester on break. With time has tolerated well and prefers to continue.     Discussed program resources for weight management and lifestyle support.          ICD-10-CM ICD-9-CM   1. HAMZAH (generalized anxiety disorder)  F41.1 300.02   2. Panic disorder  F41.0 300.01   3. Iron deficiency  E61.1 280.9   4. Insomnia, unspecified type  G47.00 780.52   5. Depression, unspecified depression type  F32.A 311               Intervention/Counseling/Treatment Plan   Medication Management: Continue Effexor 225 mg for depression, anxiety and panic. OK to refill clonazepam and discussed this being temporary while we wait to achieve therapeutic levels of Effexor, or find alternative PRN medication option. Continue trazodone 50mg for insomnia and adjunct mood support as patient prefers to keep as PRN as needed  Labs, Diagnostic Studies: reviewed labs ordered at initial visit including CBC,  CMP, TSH, Vitamin D. Revealed iron deficiency. Discussed supplement in portal and currently taking. Plan to reassess at next visit to assess if able to replenish stores with consistent administration.   Referral to [facility]otherapy.    reviewed no flagged dispensing.  Discussed with  patient informed consent, risks vs. benefits, alternative treatments, side effect profile and the inherent unpredictability of individual responses to these treatments. The patient expresses understanding of the above and displays the capacity to agree with this current plan and had no other questions.  Encouraged Patient to keep future appointments.   Take medications as prescribed and abstain from substance abuse.   Safety plan reviewed with patient for worsening condition or suicidal ideations. In the event of an emergency patient was advised to go to the emergency room.       Return to Clinic: 3 months

## 2024-02-20 ENCOUNTER — PATIENT MESSAGE (OUTPATIENT)
Dept: PSYCHIATRY | Facility: CLINIC | Age: 19
End: 2024-02-20
Payer: MEDICAID

## 2024-02-21 RX ORDER — CLONAZEPAM 1 MG/1
1 TABLET ORAL 2 TIMES DAILY PRN
Qty: 60 TABLET | Refills: 0 | Status: SHIPPED | OUTPATIENT
Start: 2024-02-21 | End: 2024-06-12 | Stop reason: SDUPTHER

## 2024-04-15 ENCOUNTER — PATIENT MESSAGE (OUTPATIENT)
Dept: PSYCHIATRY | Facility: CLINIC | Age: 19
End: 2024-04-15
Payer: MEDICAID

## 2024-05-01 ENCOUNTER — PATIENT MESSAGE (OUTPATIENT)
Dept: PSYCHIATRY | Facility: CLINIC | Age: 19
End: 2024-05-01
Payer: MEDICAID

## 2024-06-12 ENCOUNTER — PATIENT MESSAGE (OUTPATIENT)
Dept: PSYCHIATRY | Facility: CLINIC | Age: 19
End: 2024-06-12
Payer: COMMERCIAL

## 2024-06-12 DIAGNOSIS — F41.1 GAD (GENERALIZED ANXIETY DISORDER): Primary | ICD-10-CM

## 2024-06-12 RX ORDER — CLONAZEPAM 1 MG/1
1 TABLET ORAL 2 TIMES DAILY PRN
Qty: 60 TABLET | Refills: 0 | Status: SHIPPED | OUTPATIENT
Start: 2024-06-12 | End: 2025-06-12

## 2024-06-20 ENCOUNTER — PATIENT MESSAGE (OUTPATIENT)
Dept: PSYCHIATRY | Facility: CLINIC | Age: 19
End: 2024-06-20
Payer: COMMERCIAL

## 2024-06-20 ENCOUNTER — OFFICE VISIT (OUTPATIENT)
Dept: PSYCHIATRY | Facility: CLINIC | Age: 19
End: 2024-06-20
Payer: COMMERCIAL

## 2024-06-20 VITALS
DIASTOLIC BLOOD PRESSURE: 73 MMHG | WEIGHT: 273.38 LBS | BODY MASS INDEX: 42.82 KG/M2 | HEART RATE: 104 BPM | SYSTOLIC BLOOD PRESSURE: 131 MMHG

## 2024-06-20 DIAGNOSIS — F41.1 GENERALIZED ANXIETY DISORDER WITH PANIC ATTACKS: Primary | ICD-10-CM

## 2024-06-20 DIAGNOSIS — F41.0 GENERALIZED ANXIETY DISORDER WITH PANIC ATTACKS: Primary | ICD-10-CM

## 2024-06-20 DIAGNOSIS — F33.0 MILD EPISODE OF RECURRENT MAJOR DEPRESSIVE DISORDER: ICD-10-CM

## 2024-06-20 PROBLEM — F33.9 RECURRENT MAJOR DEPRESSIVE DISORDER: Status: ACTIVE | Noted: 2024-06-20

## 2024-06-20 PROCEDURE — 99999 PR PBB SHADOW E&M-EST. PATIENT-LVL III: CPT | Mod: PBBFAC,,, | Performed by: STUDENT IN AN ORGANIZED HEALTH CARE EDUCATION/TRAINING PROGRAM

## 2024-06-20 PROCEDURE — 99215 OFFICE O/P EST HI 40 MIN: CPT | Mod: S$GLB,,, | Performed by: STUDENT IN AN ORGANIZED HEALTH CARE EDUCATION/TRAINING PROGRAM

## 2024-06-20 PROCEDURE — 3075F SYST BP GE 130 - 139MM HG: CPT | Mod: CPTII,S$GLB,, | Performed by: STUDENT IN AN ORGANIZED HEALTH CARE EDUCATION/TRAINING PROGRAM

## 2024-06-20 PROCEDURE — 3008F BODY MASS INDEX DOCD: CPT | Mod: CPTII,S$GLB,, | Performed by: STUDENT IN AN ORGANIZED HEALTH CARE EDUCATION/TRAINING PROGRAM

## 2024-06-20 PROCEDURE — 3078F DIAST BP <80 MM HG: CPT | Mod: CPTII,S$GLB,, | Performed by: STUDENT IN AN ORGANIZED HEALTH CARE EDUCATION/TRAINING PROGRAM

## 2024-06-20 RX ORDER — VENLAFAXINE HYDROCHLORIDE 75 MG/1
75 CAPSULE, EXTENDED RELEASE ORAL DAILY
Qty: 90 CAPSULE | Refills: 1 | Status: SHIPPED | OUTPATIENT
Start: 2024-06-20 | End: 2025-06-20

## 2024-06-20 RX ORDER — VENLAFAXINE HYDROCHLORIDE 150 MG/1
150 CAPSULE, EXTENDED RELEASE ORAL DAILY
Qty: 90 CAPSULE | Refills: 1 | Status: SHIPPED | OUTPATIENT
Start: 2024-06-20 | End: 2025-06-20
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
## 2024-06-21 NOTE — PROGRESS NOTES
STAFF COMMENTS: I have discussed pt with Dr. Kraus and reviewed the history and exam. I agree and concur with the assessment and plan.

## 2024-06-24 ENCOUNTER — PATIENT MESSAGE (OUTPATIENT)
Dept: PSYCHIATRY | Facility: CLINIC | Age: 19
End: 2024-06-24
Payer: COMMERCIAL

## 2024-06-25 ENCOUNTER — TELEPHONE (OUTPATIENT)
Dept: PSYCHIATRY | Facility: CLINIC | Age: 19
End: 2024-06-25
Payer: COMMERCIAL

## 2024-10-09 ENCOUNTER — TELEPHONE (OUTPATIENT)
Dept: PSYCHIATRY | Facility: CLINIC | Age: 19
End: 2024-10-09
Payer: COMMERCIAL

## 2025-02-17 ENCOUNTER — OFFICE VISIT (OUTPATIENT)
Dept: OTOLARYNGOLOGY | Facility: CLINIC | Age: 20
End: 2025-02-17
Payer: COMMERCIAL

## 2025-02-17 VITALS — WEIGHT: 275.56 LBS | BODY MASS INDEX: 43.16 KG/M2

## 2025-02-17 DIAGNOSIS — R42 DIZZINESS: Primary | ICD-10-CM

## 2025-02-17 DIAGNOSIS — H91.93 SUBJECTIVE HEARING CHANGE OF BOTH EARS: ICD-10-CM

## 2025-02-17 NOTE — PROGRESS NOTES
Referring Provider:    No referring provider defined for this encounter.  Subjective:   Patient: Ismael Oconnell [id number], :2005   Visit date:2025 3:20 PM    Chief Complaint:  Cerumen Impaction (Pt is coming in today for vertigo and right ear pain pt states she has fluid in her ear  )    HPI:    Prior notes reviewed by myself.  Clinical documentation obtained by nursing staff reviewed.      19-year-old female presents for evaluation muffled hearing and intermittent dizziness.  She reports having intermittent feelings of dizziness that she describes as feeling off balance but not true spinning.  These episodes seem to be exacerbated by sudden head movements.  She notices the episodes more when she does not take her anxiety medication.  She feels that both ears are full and may have fluid behind her eardrums.  No recent audiogram      Objective:     Physical Exam:  Vitals:  Wt 125 kg (275 lb 9.2 oz)   BMI 43.16 kg/m²   General appearance:  Well developed, well nourished    Ears:  Otoscopy of external auditory canals and tympanic membranes was normal, clinical speech reception thresholds grossly intact, no mass/lesion of auricle.    Nose:  No masses/lesions of external nose, nasal mucosa, septum, and turbinates were within normal limits.    Mouth:  No mass/lesion of lips, teeth, gums, hard/soft palate, tongue, tonsils, or oropharynx.    Neck & Lymphatics:  No cervical lymphadenopathy, no neck mass/crepitus/ asymmetry, trachea is midline, no thyroid enlargement/tenderness/mass.    Neuro:  CN II-XII intact bilaterally        [x]  Data Reviewed:    Lab Results   Component Value Date    WBC 10.0 2023    HGB 12.5 2023    HCT 37.6 2023    MCV 87 2022    EOSINOPHIL 1 2023           Assessment & Plan:   Dizziness    Subjective hearing change of both ears         She has a normal ENT exam today.  We discussed the multitude of different things that can lead to dizziness.  I  recommended an audiogram and a vestibular screen.  Will schedule this asap.  If these are normal, she understands that she may need to be evaluated again by her PCP and/or neurology    Shawn Cason M.D.  Department of Otolaryngology - Head & Neck Surgery  [address] [facility].  [place] [address]  P: [phone]  F: [phone]        DISCLAIMER: This note was prepared with [employer] voice recognition transcription software. Garbled syntax, mangled pronouns, and other bizarre constructions may be attributed to that software system. While efforts were made to correct any mistakes made by this voice recognition program, some errors and/or omissions may remain in the note that were missed when the note was originally created.

## 2025-03-20 ENCOUNTER — CLINICAL SUPPORT (OUTPATIENT)
Dept: AUDIOLOGY | Facility: CLINIC | Age: 20
End: 2025-03-20
Payer: COMMERCIAL

## 2025-03-20 DIAGNOSIS — H93.8X3 EAR FULLNESS, BILATERAL: ICD-10-CM

## 2025-03-20 DIAGNOSIS — R42 DIZZINESS: Primary | ICD-10-CM

## 2025-03-20 PROCEDURE — 99999 PR PBB SHADOW E&M-EST. PATIENT-LVL I: CPT | Mod: PBBFAC,,, | Performed by: AUDIOLOGIST-HEARING AID FITTER

## 2025-03-20 PROCEDURE — 92540 BASIC VESTIBULAR EVALUATION: CPT | Mod: S$GLB,,, | Performed by: AUDIOLOGIST-HEARING AID FITTER

## 2025-03-20 PROCEDURE — 92557 COMPREHENSIVE HEARING TEST: CPT | Mod: S$GLB,,, | Performed by: AUDIOLOGIST-HEARING AID FITTER

## 2025-03-20 PROCEDURE — 92567 TYMPANOMETRY: CPT | Mod: S$GLB,,, | Performed by: AUDIOLOGIST-HEARING AID FITTER

## 2025-03-20 NOTE — PROGRESS NOTES
Referring provider: Dr. Kamla Guevara Taylor Oconnell was seen 03/20/2025 for an audiological evaluation and vestibular screen.  Patient reports no change in symptoms since she saw ENT.  She reports fullness sensation in both ears, right more so than left, as if there is fluid in the ear. She does not appreciate hearing loss. No tinnitus. She also reports intermittent dizziness. She describes her dizziness as feeling off balance but not true spinning. These episodes seem to be exacerbated by sudden head movements, such as turning head rapidly while driving. She notices the episodes more when she does not take her anxiety medication.     Audiology Report:  Results reveal normal hearing 125-8000 Hz for the right ear, and normal hearing 125-8000 Hz for the left ear.   Speech Reception Thresholds were 10 dBHL for the right ear and 10 dBHL for the left ear.   Word recognition scores were excellent for the right ear and excellent for the left ear.   Tympanograms were Type A for the right ear and Type A for the left ear.    Videonystagmography Screen (VNG):  Oculomotor function tests (sinusoidal tracking, saccade, optokinetic) were normal and symmetric.  Gaze test was absent for nystagmus.  Spontaneous test was absent for nystagmus.  Head-shake test was negative. There was <clinically insignificant> two beats of 2 d/s right-beating after head-shake nystagmus.  Static Positional test revealed <clinically insignificant> 2 d/s up-beating nystagmus to head center.    Head Center: 2 d/s UB   Head Right: Absent for nystagmus   Head Left: Absent for nystagmus  Vinayak-Hallpike Right was negative for BPPV.  Vinayak-Hallpike Left was negative for BPPV.    Summary: Normal VNG screen. Negative for BPPV. There was <clinically insignificant> after head-shake and positional nystagmus. For this reason and subjective symptoms, patient is provided VOR exercies to trial daily for the next two weeks.     Recommendations:  ENT review    Patient was  counseled on the above findings.  Tracings are to be scanned.

## 2025-04-07 ENCOUNTER — OFFICE VISIT (OUTPATIENT)
Dept: OTOLARYNGOLOGY | Facility: CLINIC | Age: 20
End: 2025-04-07
Payer: COMMERCIAL

## 2025-04-07 VITALS — BODY MASS INDEX: 43.89 KG/M2 | WEIGHT: 280.19 LBS

## 2025-04-07 DIAGNOSIS — H93.11 TINNITUS OF RIGHT EAR: ICD-10-CM

## 2025-04-07 DIAGNOSIS — H69.93 ETD (EUSTACHIAN TUBE DYSFUNCTION), BILATERAL: Primary | ICD-10-CM

## 2025-04-07 PROCEDURE — 3008F BODY MASS INDEX DOCD: CPT | Mod: CPTII,S$GLB,, | Performed by: OTOLARYNGOLOGY

## 2025-04-07 PROCEDURE — 99213 OFFICE O/P EST LOW 20 MIN: CPT | Mod: S$GLB,,, | Performed by: OTOLARYNGOLOGY

## 2025-04-07 PROCEDURE — 99999 PR PBB SHADOW E&M-EST. PATIENT-LVL III: CPT | Mod: PBBFAC,,, | Performed by: OTOLARYNGOLOGY

## 2025-04-07 PROCEDURE — 1159F MED LIST DOCD IN RCRD: CPT | Mod: CPTII,S$GLB,, | Performed by: OTOLARYNGOLOGY

## 2025-04-07 RX ORDER — FLUTICASONE PROPIONATE 50 MCG
2 SPRAY, SUSPENSION (ML) NASAL DAILY
Qty: 16 G | Refills: 0 | Status: SHIPPED | OUTPATIENT
Start: 2025-04-07

## 2025-04-07 NOTE — PATIENT INSTRUCTIONS
Dr. Cason's Eustachian Tube Dysfunction Protocol      Day 1-3 (Perform 2x per day)     Afrin (pump spray mist OTC) 2 sprays in each nostril   Fluticasone nasal spray 2 sprays in each nostril      Days 4 - follow up visit (perform 1x per day)    Fluticasone nasal spray 2 sprays in each nostril    *Buy the OTC Afrin pump spray mist - it should be in the allergy/cold/sinus section of the pharmacy  *Autoinsufflate (POP your ears gently!) at least 4 times per day until your follow up appointment    *TAKE ALL OTHER MEDICATIONS AS DIRECTED BY DR CASON    *MAKE SURE YOU STOP USING AFRIN AFTER THE 3RD DAY AS THERE IS A RISK OF BECOMING DEPENDENT ON THAT MEDICATION

## 2025-04-07 NOTE — PROGRESS NOTES
"Referring Provider:    No referring provider defined for this encounter.  Subjective:   Patient: Ismael Oconnell [id number], :2005   Visit date:2025 3:20 PM    Chief Complaint:  Cerumen Impaction (Pt is coming in today for cerumen impaction right ear pt states her ear feel like it full and impacted )    HPI:    Prior notes reviewed by myself.  Clinical documentation obtained by nursing staff reviewed.      19-year-old female presents for evaluation muffled hearing and intermittent dizziness.  She reports having intermittent feelings of dizziness that she describes as feeling off balance but not true spinning.  These episodes seem to be exacerbated by sudden head movements.  She notices the episodes more when she does not take her anxiety medication.  She feels that both ears are full and may have fluid behind her eardrums.  No recent audiogram    25 update: Patient reports still having ear fullness AD. Describes it as feeling she has fluid in her ear. She is unable to pop her ear. C/o intermittent "deep" ringing AD. She states if she compresses her EAC with her knuckle, the symptoms improve. Denies otologic surgeries. Not currently using any nasal sprays.       Objective:     Physical Exam:  Vitals:  Wt 127.1 kg (280 lb 3.3 oz)   BMI 43.89 kg/m²   General appearance:  Well developed, well nourished    Ears:  Otoscopy of external auditory canals and tympanic membranes was normal, clinical speech reception thresholds grossly intact, no mass/lesion of auricle.  Tympanogram - AS: pk 0.3 ml/-35daPa; ECV 1.3   AD: pk 0.5ml/-11daPa; ECV 1.4     Nose:  No masses/lesions of external nose, nasal mucosa, septum, and turbinates were within normal limits.    Mouth:  No mass/lesion of lips, teeth, gums, hard/soft palate, tongue, tonsils, or oropharynx.    Neck & Lymphatics:  No cervical lymphadenopathy, no neck mass/crepitus/ asymmetry, trachea is midline, no thyroid enlargement/tenderness/mass.    Neuro:  CN " II-XII intact bilaterally        [x]  Data Reviewed:    Lab Results   Component Value Date    WBC 10.0 11/13/2023    HGB 12.5 11/13/2023    HCT 37.6 11/13/2023    MCV 87 08/11/2022    EOSINOPHIL 1 11/13/2023       Independent review: Normal audiogram     Videonystagmography Screen (VNG):  Oculomotor function tests (sinusoidal tracking, saccade, optokinetic) were normal and symmetric.  Gaze test was absent for nystagmus.  Spontaneous test was absent for nystagmus.  Head-shake test was negative. There was <clinically insignificant> two beats of 2 d/s right-beating after head-shake nystagmus.  Static Positional test revealed <clinically insignificant> 2 d/s up-beating nystagmus to head center.               Head Center: 2 d/s UB              Head Right: Absent for nystagmus              Head Left: Absent for nystagmus  Vinayak-Hallpike Right was negative for BPPV.  [place]-Hallpike Left was negative for BPPV.     Summary: Normal VNG screen. Negative for BPPV. There was <clinically insignificant> after head-shake and positional nystagmus. For this reason and subjective symptoms, patient is provided VOR exercies to trial daily for the next two weeks.       Assessment & Plan:   ETD (Eustachian tube dysfunction), bilateral    Tinnitus of right ear    Other orders  -     fluticasone propionate (FLONASE) 50 mcg/actuation nasal spray; 2 sprays (100 mcg total) by Each Nostril route once daily.  Dispense: 16 g; Refill: 0           She has a normal ENT exam today.  We discussed the multitude of different things that can lead to dizziness.  I recommended an audiogram and a vestibular screen.  Will schedule this asap.  If these are normal, she understands that she may need to be evaluated again by her PCP and/or neurology    4/7/25 update:  We reviewed her audiogram together in detail. She can have repeat audio PRN if any sudden changes. We discussed her symptoms today and I would like her to follow ETD protocol as indicated below. RTC in  3-4 weeks or sooner if needed. Recommend she can get NeilMed Eustachi OTC to help with auto insufflation.       Dr. Cason's Eustachian Tube Dysfunction Protocol    Day 1-3 (Perform 2x per day)     Afrin (pump spray mist OTC) 2 sprays in each nostril   Fluticasone nasal spray 2 sprays in each nostril      Days 4 - follow up visit (perform 1x per day)    Fluticasone nasal spray 2 sprays in each nostril    *Buy the OTC Afrin pump spray mist - it should be in the allergy/cold/sinus section of the pharmacy  *Autoinsufflate (POP your ears gently!) at least 4 times per day until your follow up appointment    *TAKE ALL OTHER MEDICATIONS AS DIRECTED BY DR CASON    *MAKE SURE YOU STOP USING AFRIN AFTER THE 3RD DAY AS THERE IS A RISK OF BECOMING DEPENDENT ON THAT MEDICATION   Shawn Cason M.D.  Department of Otolaryngology - Head & Neck Surgery  [address] [place] [address].  [place] [address]  P: [phone]  F: [phone]        DISCLAIMER: This note was prepared with [employer] voice recognition transcription software. Garbled syntax, mangled pronouns, and other bizarre constructions may be attributed to that software system. While efforts were made to correct any mistakes made by this voice recognition program, some errors and/or omissions may remain in the note that were missed when the note was originally created.